# Patient Record
Sex: MALE | Race: WHITE | NOT HISPANIC OR LATINO | Employment: FULL TIME | URBAN - METROPOLITAN AREA
[De-identification: names, ages, dates, MRNs, and addresses within clinical notes are randomized per-mention and may not be internally consistent; named-entity substitution may affect disease eponyms.]

---

## 2017-08-01 ENCOUNTER — HOSPITAL ENCOUNTER (EMERGENCY)
Facility: OTHER | Age: 22
Discharge: HOME | End: 2017-08-01
Attending: EMERGENCY MEDICINE
Payer: COMMERCIAL

## 2017-08-01 VITALS
HEART RATE: 61 BPM | TEMPERATURE: 99 F | SYSTOLIC BLOOD PRESSURE: 122 MMHG | RESPIRATION RATE: 16 BRPM | OXYGEN SATURATION: 99 % | WEIGHT: 147.25 LBS | DIASTOLIC BLOOD PRESSURE: 61 MMHG

## 2017-08-01 DIAGNOSIS — S69.91XA HAND TRAUMA, RIGHT, INITIAL ENCOUNTER: Primary | ICD-10-CM

## 2017-08-01 DIAGNOSIS — S60.221A CONTUSION OF RIGHT HAND, INITIAL ENCOUNTER: ICD-10-CM

## 2017-08-01 PROCEDURE — 25000003 PHARM REV CODE 250: Performed by: EMERGENCY MEDICINE

## 2017-08-01 PROCEDURE — 99283 EMERGENCY DEPT VISIT LOW MDM: CPT

## 2017-08-01 RX ORDER — IBUPROFEN 600 MG/1
600 TABLET ORAL EVERY 8 HOURS PRN
Qty: 15 TABLET | Refills: 0 | Status: SHIPPED | OUTPATIENT
Start: 2017-08-01 | End: 2023-03-24

## 2017-08-01 RX ORDER — IBUPROFEN 600 MG/1
600 TABLET ORAL
Status: COMPLETED | OUTPATIENT
Start: 2017-08-01 | End: 2017-08-01

## 2017-08-01 RX ADMIN — IBUPROFEN 600 MG: 600 TABLET, FILM COATED ORAL at 07:08

## 2017-08-02 NOTE — ED TRIAGE NOTES
LOC: The patient is awake and alert; oriented x 3 and speaking appropriately.  APPEARANCE: Patient resting comfortably, patient is clean and well groomed  SKIN: warm and dry, normal skin turgor & moist mucus membranes, skin intact, no breakdown noted.  MUSCULOSKELETAL: Swelling noted to right hand.  RESPIRATORY: Airway is open and patent, breath sounds clear throughout all lung fields; respirations are spontaneous, normal effort and rate  CARDIAC: Patient has a normal rate, no peripheral edema noted, capillary refill < 3 seconds; No complaints of chest pain   ABDOMEN: Soft and non tender to palpation, no distention noted. Bowel sounds present x 4

## 2017-08-02 NOTE — ED TRIAGE NOTES
Patient states that he punched a chair out of anger earlier today.  Swelling noted to right hand.

## 2017-08-02 NOTE — DISCHARGE INSTRUCTIONS
Rest. If your hand continues to hurt in a few days, you may need to have your xrays repeated. Return for any new or acute problems or concerns.

## 2017-08-10 NOTE — ED PROVIDER NOTES
Encounter Date: 8/1/2017       History     Chief Complaint   Patient presents with    Hand Injury     states he punched a chair and now has pain and swelling to his rt hand     Mr Toussaint reports he punched a hard object earlier in the day and has pain to the dorsomedial aspect of his right hand.  He denies other trauma.  He reports it does hurt to move his wrist and the base of his right hand on the fifth digit side.  He has not taken anything for pain.  He denies any numbness or tingling.  He reports pain is moderate.      The history is provided by the patient.     Review of patient's allergies indicates:  No Known Allergies  Past Medical History:   Diagnosis Date    Tibia fracture     Left      Past Surgical History:   Procedure Laterality Date    NOSE SURGERY      TIBIA FRACTURE SURGERY       History reviewed. No pertinent family history.  Social History   Substance Use Topics    Smoking status: Former Smoker     Quit date: 7/23/2017    Smokeless tobacco: Never Used    Alcohol use No     Review of Systems   Musculoskeletal:        Positive right hand pain   All other systems reviewed and are negative.      Physical Exam     Initial Vitals [08/01/17 1917]   BP Pulse Resp Temp SpO2   122/61 61 16 99.1 °F (37.3 °C) 99 %      MAP       81.33         Physical Exam    Nursing note and vitals reviewed.  Constitutional: He appears well-developed and well-nourished. He is not diaphoretic. No distress.   HENT:   Head: Normocephalic and atraumatic.   Eyes: EOM are normal. Pupils are equal, round, and reactive to light.   Pulmonary/Chest: No respiratory distress.   Musculoskeletal: He exhibits tenderness.   Pain with passive and active range of motion of right hand.  Pain is more localized to the dorsomedial aspect of the hand, on the hyper thenar side of the hand.  Mild diffuse swelling noted in that region.  No deformity, perfusion abnormalities, skin breaks or other abnormality noted.   Neurological: He is alert.    Skin: Skin is warm. Capillary refill takes less than 2 seconds. No rash noted. No erythema.   Psychiatric: He has a normal mood and affect. His behavior is normal. Thought content normal.         ED Course   Procedures  Labs Reviewed - No data to display           Imaging Results          X-Ray Wrist Complete Right (Final result)  Result time 08/01/17 20:31:01    Final result by Neil Mason MD (08/01/17 20:31:01)                 Impression:      No acute fracture.      Electronically signed by: NEIL MASON MD  Date:     08/01/17  Time:    20:31              Narrative:    History: hand and wrist pain, injury.    RIGHT wrist 3 views:    No fractures or dislocations.  Unremarkable visualized bony structures.                             X-Ray Hand 3 View Right (Final result)  Result time 08/01/17 20:03:56   Procedure changed from X-Ray Hand 2 View Right     Final result by Rosana Jaramillo MD (08/01/17 20:03:56)                 Impression:      Please see above.      Electronically signed by: Rosana Jaramillo MD  Date:     08/01/17  Time:    20:03              Narrative:    Time of Procedure: 08/01/17 19:24:40  Accession # [id number]      Comparison: None.    Number of images: 3.  PA, lateral and oblique views.    Findings:  Pain marker is directed at the hypothenar eminence and the dorsum of the hand.  There may be soft tissue swelling at these sites.    However I detect no fracture, dislocation, radiopaque retained foreign body, lytic or blastic lesion.  Please note that certain foreign bodies including green wood and many forms of glass are not radiopaque.    Ulnar styloid is intact.      If the patient has point tenderness over the snuffbox I recommend prompt further assessment with dedicated views.                              Medical Decision Making:   ED Management:  Mr Toussaint does not report pain over snuffbox, has no pain with axial load manipulation of thumb. He is stable for d/c.  Discussed that not  all fractures can be seen initially and he should have repeat x-rays if continues to have pain in a few days.  He is stable for discharge.  There is no indication for further emergent intervention or evaluation at this time.                   ED Course     Clinical Impression:   The primary encounter diagnosis was Hand trauma, right, initial encounter. A diagnosis of Contusion of right hand, initial encounter was also pertinent to this visit.                           Leeanne Kenny MD  08/10/17 0744       Leeanne Kenny MD  08/10/17 0745

## 2023-03-24 ENCOUNTER — OFFICE VISIT (OUTPATIENT)
Dept: INTERNAL MEDICINE | Facility: CLINIC | Age: 28
End: 2023-03-24
Payer: COMMERCIAL

## 2023-03-24 VITALS
BODY MASS INDEX: 23.18 KG/M2 | HEART RATE: 59 BPM | DIASTOLIC BLOOD PRESSURE: 72 MMHG | SYSTOLIC BLOOD PRESSURE: 112 MMHG | WEIGHT: 156.5 LBS | TEMPERATURE: 97 F | HEIGHT: 69 IN

## 2023-03-24 DIAGNOSIS — R53.81 MALAISE AND FATIGUE: Primary | ICD-10-CM

## 2023-03-24 DIAGNOSIS — R53.83 MALAISE AND FATIGUE: Primary | ICD-10-CM

## 2023-03-24 DIAGNOSIS — R00.1 BRADYCARDIA: ICD-10-CM

## 2023-03-24 DIAGNOSIS — Z11.4 ENCOUNTER FOR SCREENING FOR HIV: ICD-10-CM

## 2023-03-24 DIAGNOSIS — Z13.29 SCREENING FOR THYROID DISORDER: ICD-10-CM

## 2023-03-24 DIAGNOSIS — Z13.220 SCREENING CHOLESTEROL LEVEL: ICD-10-CM

## 2023-03-24 DIAGNOSIS — Z13.1 DIABETES MELLITUS SCREENING: ICD-10-CM

## 2023-03-24 DIAGNOSIS — Z11.59 ENCOUNTER FOR HEPATITIS C SCREENING TEST FOR LOW RISK PATIENT: ICD-10-CM

## 2023-03-24 PROCEDURE — 99203 OFFICE O/P NEW LOW 30 MIN: CPT | Mod: S$GLB,,, | Performed by: INTERNAL MEDICINE

## 2023-03-24 PROCEDURE — 99203 PR OFFICE/OUTPT VISIT, NEW, LEVL III, 30-44 MIN: ICD-10-PCS | Mod: S$GLB,,, | Performed by: INTERNAL MEDICINE

## 2023-03-24 NOTE — PROGRESS NOTES
Chief C/o:    Establish Care and Nausea (Pt. c/o nausea and fatigue x 2 days. Pt. States fatigue has gotten better x 1 day.)        Health Care Maintenance    Health Maintenance         Date Due Completion Date    Hepatitis C Screening Never done ---    Lipid Panel Never done ---    HIV Screening Never done ---    TETANUS VACCINE 06/03/2019 6/3/2009    Influenza Vaccine (1) 06/30/2023 (Originally 9/1/2022) ---                   HISTORY OF PRESENT ILLNESS:    RAFA Toussaint is a 27 y.o. male who presents to the clinic today for Establish Care and Nausea (Pt. c/o nausea and fatigue x 2 days. Pt. States fatigue has gotten better x 1 day.)  .  Patient felt tired and fatigued 2 days ago with no fever no chills, no cough and no other complains, and today he feels a little bit better, his coming to be established as well.    He denies chest pain, no shortness of breath, no fever, no chills, no burning on urination, no other complain.                  ALLERGIES AND MEDICATIONS: updated and reviewed.  Review of patient's allergies indicates:  No Known Allergies  Medication List with Changes/Refills   Discontinued Medications    IBUPROFEN (ADVIL,MOTRIN) 600 MG TABLET    Take 1 tablet (600 mg total) by mouth every 8 (eight) hours as needed for Pain.       Problem List:  Patient Active Problem List   Diagnosis    BMI 23.0-23.9, adult    Bradycardia    Malaise and fatigue         CARE TEAM:    Patient Care Team:  Edwardo Roberto MD as PCP - General (Pediatrics)         REVIEW OF SYSTEMS:    Review of Systems   Constitutional:  Positive for fatigue. Negative for appetite change, chills, diaphoresis, fever and unexpected weight change.   HENT:  Negative for congestion, drooling, ear discharge, ear pain, facial swelling, hearing loss, nosebleeds, rhinorrhea, sinus pain, sneezing, sore throat, tinnitus, trouble swallowing and voice change.    Eyes:  Negative for pain, discharge, redness, itching and visual disturbance.  "  Respiratory:  Negative for cough, choking, chest tightness, shortness of breath, wheezing and stridor.    Cardiovascular:  Negative for chest pain, palpitations and leg swelling.   Gastrointestinal:  Negative for abdominal distention, abdominal pain, blood in stool, constipation, diarrhea, nausea and vomiting.   Endocrine: Negative for cold intolerance, heat intolerance, polydipsia, polyphagia and polyuria.   Genitourinary:  Negative for difficulty urinating, dysuria, flank pain, frequency, hematuria and urgency.   Musculoskeletal:  Negative for arthralgias, back pain, gait problem, joint swelling, myalgias, neck pain and neck stiffness.   Skin:  Negative for color change, pallor, rash and wound.   Allergic/Immunologic: Negative for environmental allergies, food allergies and immunocompromised state.   Neurological:  Negative for dizziness, tremors, seizures, syncope, speech difficulty, weakness, light-headedness, numbness and headaches.   Hematological:  Negative for adenopathy. Does not bruise/bleed easily.   Psychiatric/Behavioral:  Negative for agitation, behavioral problems, confusion, decreased concentration, dysphoric mood, hallucinations, sleep disturbance and suicidal ideas. The patient is not nervous/anxious.        PHYSICAL EXAM:    Vitals:    03/24/23 0914   BP: 112/72   Pulse: (!) 59   Temp: 97.4 °F (36.3 °C)     Weight: 71 kg (156 lb 8.4 oz)   Height: 5' 8.7" (174.5 cm)   Body mass index is 23.32 kg/m².  Vitals:    03/24/23 0914   BP: 112/72   Pulse: (!) 59   Temp: 97.4 °F (36.3 °C)   TempSrc: Temporal   Weight: 71 kg (156 lb 8.4 oz)   Height: 5' 8.7" (1.745 m)   PainSc: 0-No pain          Physical Exam  Vitals reviewed.   Constitutional:       General: He is not in acute distress.     Appearance: Normal appearance. He is normal weight. He is not ill-appearing, toxic-appearing or diaphoretic.      Comments: .on     HENT:      Head: Normocephalic and atraumatic.      Right Ear: Tympanic membrane, ear " canal and external ear normal. There is no impacted cerumen.      Left Ear: Tympanic membrane, ear canal and external ear normal. There is no impacted cerumen.      Nose: Nose normal. No congestion or rhinorrhea.      Mouth/Throat:      Mouth: Mucous membranes are moist.      Pharynx: Oropharynx is clear. No oropharyngeal exudate or posterior oropharyngeal erythema.   Eyes:      General: No scleral icterus.        Right eye: No discharge.         Left eye: No discharge.      Extraocular Movements: Extraocular movements intact.      Conjunctiva/sclera: Conjunctivae normal.      Pupils: Pupils are equal, round, and reactive to light.   Cardiovascular:      Rate and Rhythm: Regular rhythm. Bradycardia present.      Pulses: Normal pulses.      Heart sounds: Normal heart sounds. No murmur heard.    No friction rub. No gallop.   Pulmonary:      Effort: Pulmonary effort is normal. No respiratory distress.      Breath sounds: Normal breath sounds. No stridor. No wheezing, rhonchi or rales.   Chest:      Chest wall: No tenderness.   Abdominal:      General: Bowel sounds are normal. There is no distension.      Palpations: Abdomen is soft. There is no mass.      Tenderness: There is no abdominal tenderness. There is no guarding or rebound.      Hernia: No hernia is present.   Musculoskeletal:         General: No swelling, tenderness, deformity or signs of injury. Normal range of motion.      Cervical back: Normal range of motion and neck supple. No rigidity.      Right lower leg: No edema.      Left lower leg: No edema.   Lymphadenopathy:      Cervical: No cervical adenopathy.   Skin:     General: Skin is warm and dry.      Capillary Refill: Capillary refill takes less than 2 seconds.      Coloration: Skin is not jaundiced or pale.      Findings: No bruising, erythema, lesion or rash.   Neurological:      General: No focal deficit present.      Mental Status: He is alert and oriented to person, place, and time.      Cranial  Nerves: No cranial nerve deficit.      Sensory: No sensory deficit.      Motor: No weakness.      Coordination: Coordination normal.      Deep Tendon Reflexes: Reflexes normal.   Psychiatric:         Mood and Affect: Mood normal.         Behavior: Behavior normal.         Thought Content: Thought content normal.         Judgment: Judgment normal.          Labs:    Lab Results   Component Value Date    GLU 93 09/04/2014     09/04/2014    K 4.3 09/04/2014     09/04/2014    CO2 23 09/04/2014    BUN 15 09/04/2014    CREATININE 1.1 09/04/2014    CALCIUM 9.9 09/04/2014    PROT 7.4 09/04/2014    ALBUMIN 4.6 09/04/2014    BILITOT 0.5 09/04/2014    ALKPHOS 189 (H) 09/04/2014    AST 59 (H) 09/04/2014    ALT 88 (H) 09/04/2014    ANIONGAP 9 09/04/2014    ESTGFRAFRICA SEE COMMENT 09/04/2014    EGFRNONAA SEE COMMENT 09/04/2014        No results found for: CHOL, TRIG, HDL, LDLCALC, TOTALCHOLEST  No results found for: TSH  No results found for: HGBA1C, ESTIMATEDAVG   No components found for: MICROALBUMIN/CREATININE    ASSESSMENT & PLAN:    1. Malaise and fatigue  - CBC Auto Differential; Future  - Sedimentation Rate; Future  - Vitamin B12; Future  - CBC Auto Differential  - Sedimentation Rate  - Vitamin B12  Patient presenting with malaise and fatigue which is improving, possibility of viral syndrome, patient was advised to keep well hydrated and observe, to come for evaluation or call if needed, to go to the emergency room for any distress, this could be a prodrome of something going on.  2. Bradycardia  Asymptomatic  3. BMI 23.0-23.9, adult  Continue with healthy diet and exercise to keep BMI below 25 as it is.  4. Encounter for screening for HIV  - HIV 1 / 2 ANTIBODY; Future  - HIV 1 / 2 ANTIBODY    5. Encounter for hepatitis C screening test for low risk patient  - Hepatitis C Antibody; Future  - Hepatitis C Antibody    6. Diabetes mellitus screening  - Comprehensive Metabolic Panel; Future  - Hemoglobin A1C;  Future  - Comprehensive Metabolic Panel  - Hemoglobin A1C    7. Screening cholesterol level  - Lipid Panel; Future  - Lipid Panel    8. Screening for thyroid disorder  - TSH; Future  - TSH             Orders Placed This Encounter   Procedures    CBC Auto Differential    Comprehensive Metabolic Panel    Lipid Panel    TSH    Hemoglobin A1C    HIV 1 / 2 ANTIBODY    Hepatitis C Antibody    Sedimentation Rate    Vitamin B12      No follow-ups on file. or sooner as needed.    Patient was counseled and questions and concerns were addressed.    Please note:  Parts of this report were done using a dictation software, voice to text, and sometimes the text contains some uncorrected words or sentences that are missed during revision.

## 2023-03-27 LAB
ALBUMIN SERPL-MCNC: 4.4 G/DL (ref 3.6–5.1)
ALBUMIN/GLOB SERPL: 1.9 (CALC) (ref 1–2.5)
ALP SERPL-CCNC: 68 U/L
ALT SERPL-CCNC: 25 U/L
AST SERPL-CCNC: 36 U/L (ref 10–40)
BASOPHILS # BLD AUTO: 28 CELLS/UL (ref 0–200)
BASOPHILS NFR BLD AUTO: 0.4 %
BILIRUB SERPL-MCNC: 0.7 MG/DL (ref 0.2–1.2)
BUN SERPL-MCNC: 14 MG/DL (ref 7–25)
BUN/CREAT SERPL: NORMAL (CALC) (ref 6–22)
CALCIUM SERPL-MCNC: 9.2 MG/DL
CHLORIDE SERPL-SCNC: 105 MMOL/L (ref 98–110)
CHOLEST SERPL-MCNC: 149 MG/DL
CHOLEST/HDLC SERPL: 3.1 (CALC)
CO2 SERPL-SCNC: 26 MMOL/L (ref 20–32)
CREAT SERPL-MCNC: 0.93 MG/DL
EOSINOPHIL # BLD AUTO: 41 CELLS/UL (ref 15–500)
EOSINOPHIL NFR BLD AUTO: 0.6 %
ERYTHROCYTE [DISTWIDTH] IN BLOOD BY AUTOMATED COUNT: 12.7 % (ref 11–15)
ERYTHROCYTE [SEDIMENTATION RATE] IN BLOOD BY WESTERGREN METHOD: 2 MM/H
GLOBULIN SER CALC-MCNC: 2.3 G/DL (CALC) (ref 1.9–3.7)
GLUCOSE SERPL-MCNC: 84 MG/DL (ref 65–99)
HBA1C MFR BLD: 5 % OF TOTAL HGB
HCT VFR BLD AUTO: 41.2 % (ref 38.5–45)
HCV AB S/CO SERPL IA: 0.02
HCV AB SERPL QL IA: NORMAL
HDLC SERPL-MCNC: 48 MG/DL
HGB BLD-MCNC: 14.2 G/DL (ref 13.2–15.5)
HIV 1+2 AB+HIV1 P24 AG SERPL QL IA: NORMAL
LDLC SERPL CALC-MCNC: 87 MG/DL (CALC)
LYMPHOCYTES # BLD AUTO: 1663 CELLS/UL (ref 850–3900)
LYMPHOCYTES NFR BLD AUTO: 24.1 %
MCH RBC QN AUTO: 31.6 PG (ref 27–33)
MCHC RBC AUTO-ENTMCNC: 34.5 G/DL (ref 32–36)
MCV RBC AUTO: 91.6 FL (ref 80–100)
MONOCYTES # BLD AUTO: 538 CELLS/UL (ref 200–950)
MONOCYTES NFR BLD AUTO: 7.8 %
NEUTROPHILS # BLD AUTO: 4630 CELLS/UL (ref 1500–7800)
NEUTROPHILS NFR BLD AUTO: 67.1 %
NONHDLC SERPL-MCNC: 101 MG/DL (CALC)
PLATELET # BLD AUTO: 321 THOUSAND/UL (ref 140–400)
PMV BLD REES-ECKER: 10.3 FL (ref 7.5–12.5)
POTASSIUM SERPL-SCNC: 4.1 MMOL/L (ref 3.5–5.3)
PROT SERPL-MCNC: 6.7 G/DL (ref 6.1–8.1)
RBC # BLD AUTO: 4.5 MILLION/UL (ref 4.2–5.1)
SODIUM SERPL-SCNC: 140 MMOL/L (ref 135–146)
TRIGL SERPL-MCNC: 49 MG/DL
TSH SERPL-ACNC: 1.04 MIU/L (ref 0.4–4.5)
VIT B12 SERPL-MCNC: 450 PG/ML (ref 200–1100)
WBC # BLD AUTO: 6.9 THOUSAND/UL (ref 3.8–10.8)

## 2023-04-14 ENCOUNTER — OFFICE VISIT (OUTPATIENT)
Dept: INTERNAL MEDICINE | Facility: CLINIC | Age: 28
End: 2023-04-14
Payer: COMMERCIAL

## 2023-04-14 VITALS
SYSTOLIC BLOOD PRESSURE: 106 MMHG | WEIGHT: 158.63 LBS | HEART RATE: 75 BPM | TEMPERATURE: 98 F | DIASTOLIC BLOOD PRESSURE: 68 MMHG | BODY MASS INDEX: 23.49 KG/M2 | HEIGHT: 69 IN

## 2023-04-14 DIAGNOSIS — Z00.00 ROUTINE GENERAL MEDICAL EXAMINATION AT A HEALTH CARE FACILITY: Primary | ICD-10-CM

## 2023-04-14 DIAGNOSIS — R53.81 MALAISE AND FATIGUE: ICD-10-CM

## 2023-04-14 DIAGNOSIS — R53.83 MALAISE AND FATIGUE: ICD-10-CM

## 2023-04-14 PROCEDURE — 99395 PR PREVENTIVE VISIT,EST,18-39: ICD-10-PCS | Mod: S$GLB,,, | Performed by: INTERNAL MEDICINE

## 2023-04-14 PROCEDURE — 99395 PREV VISIT EST AGE 18-39: CPT | Mod: S$GLB,,, | Performed by: INTERNAL MEDICINE

## 2023-04-14 NOTE — PROGRESS NOTES
Chief C/o:    Fatigue (Lab results check.)        Health Care Maintenance    Health Maintenance         Date Due Completion Date    TETANUS VACCINE 06/03/2019 6/3/2009    Influenza Vaccine (1) 06/30/2023 (Originally 9/1/2022) ---                   HISTORY OF PRESENT ILLNESS:    RAFA Toussaint is a 27 y.o. male who presents to the clinic today for Fatigue (Lab results check.)  .   Patient has no active complaint today.Patient is having no chest pain, no shortness of breath, no fever no chills.  Patient is having no side effects related to current medications.                ALLERGIES AND MEDICATIONS: updated and reviewed.  Review of patient's allergies indicates:  No Known Allergies      Problem List:  Patient Active Problem List   Diagnosis    BMI 23.0-23.9, adult    Malaise and fatigue         CARE TEAM:    Patient Care Team:  Flora Pérez MD as PCP - General (Internal Medicine)         REVIEW OF SYSTEMS:    Review of Systems   Constitutional:  Negative for appetite change, chills, diaphoresis, fatigue (Fatigue is resolved), fever and unexpected weight change.   HENT:  Negative for congestion, drooling, ear discharge, ear pain, facial swelling, hearing loss, nosebleeds, rhinorrhea, sinus pain, sneezing, sore throat, tinnitus, trouble swallowing and voice change.    Eyes:  Negative for pain, discharge, redness, itching and visual disturbance.   Respiratory:  Negative for cough, choking, chest tightness, shortness of breath, wheezing and stridor.    Cardiovascular:  Negative for chest pain, palpitations and leg swelling.   Gastrointestinal:  Negative for abdominal distention, abdominal pain, blood in stool, constipation, diarrhea, nausea and vomiting.   Endocrine: Negative for cold intolerance, heat intolerance, polydipsia, polyphagia and polyuria.   Genitourinary:  Negative for difficulty urinating, dysuria, flank pain, frequency, hematuria and urgency.   Musculoskeletal:  Negative for arthralgias,  "back pain, gait problem, joint swelling, myalgias, neck pain and neck stiffness.   Skin:  Negative for color change, pallor, rash and wound.   Allergic/Immunologic: Negative for environmental allergies, food allergies and immunocompromised state.   Neurological:  Negative for dizziness, tremors, seizures, syncope, speech difficulty, weakness, light-headedness, numbness and headaches.   Hematological:  Negative for adenopathy. Does not bruise/bleed easily.   Psychiatric/Behavioral:  Negative for agitation, behavioral problems, confusion, decreased concentration, dysphoric mood, hallucinations, sleep disturbance and suicidal ideas. The patient is not nervous/anxious.        PHYSICAL EXAM:    Vitals:    04/14/23 0852   BP: 106/68   Pulse: 75   Temp: 97.9 °F (36.6 °C)     Weight: 72 kg (158 lb 9.9 oz)   Height: 5' 8.7" (174.5 cm)   Body mass index is 23.63 kg/m².  Vitals:    04/14/23 0852   BP: 106/68   Pulse: 75   Temp: 97.9 °F (36.6 °C)   TempSrc: Temporal   Weight: 72 kg (158 lb 9.9 oz)   Height: 5' 8.7" (1.745 m)   PainSc: 0-No pain          Physical Exam  Vitals reviewed.   Constitutional:       General: He is not in acute distress.     Appearance: Normal appearance. He is normal weight. He is not ill-appearing, toxic-appearing or diaphoretic.      Comments: .on     HENT:      Head: Normocephalic and atraumatic.      Right Ear: Tympanic membrane, ear canal and external ear normal. There is no impacted cerumen.      Left Ear: Tympanic membrane, ear canal and external ear normal. There is no impacted cerumen.      Nose: Nose normal. No congestion or rhinorrhea.      Mouth/Throat:      Mouth: Mucous membranes are moist.      Pharynx: Oropharynx is clear. No oropharyngeal exudate or posterior oropharyngeal erythema.   Eyes:      General: No scleral icterus.        Right eye: No discharge.         Left eye: No discharge.      Extraocular Movements: Extraocular movements intact.      Conjunctiva/sclera: Conjunctivae " normal.      Pupils: Pupils are equal, round, and reactive to light.   Cardiovascular:      Rate and Rhythm: Normal rate and regular rhythm.      Pulses: Normal pulses.      Heart sounds: Normal heart sounds. No murmur heard.    No friction rub. No gallop.   Pulmonary:      Effort: Pulmonary effort is normal. No respiratory distress.      Breath sounds: Normal breath sounds. No stridor. No wheezing, rhonchi or rales.   Chest:      Chest wall: No tenderness.   Abdominal:      General: Bowel sounds are normal. There is no distension.      Palpations: Abdomen is soft. There is no mass.      Tenderness: There is no abdominal tenderness. There is no guarding or rebound.      Hernia: No hernia is present.   Musculoskeletal:         General: No swelling, tenderness, deformity or signs of injury. Normal range of motion.      Cervical back: Normal range of motion and neck supple. No rigidity.      Right lower leg: No edema.      Left lower leg: No edema.   Lymphadenopathy:      Cervical: No cervical adenopathy.   Skin:     General: Skin is warm and dry.      Capillary Refill: Capillary refill takes less than 2 seconds.      Coloration: Skin is not jaundiced or pale.      Findings: No bruising, erythema, lesion or rash.   Neurological:      General: No focal deficit present.      Mental Status: He is alert and oriented to person, place, and time.      Cranial Nerves: No cranial nerve deficit.      Sensory: No sensory deficit.      Motor: No weakness.      Coordination: Coordination normal.      Deep Tendon Reflexes: Reflexes normal.   Psychiatric:         Mood and Affect: Mood normal.         Behavior: Behavior normal.         Thought Content: Thought content normal.         Judgment: Judgment normal.    __________________________________________________________  Questionnaires to be scanned to the media section of patient's EHR records:    1-PHQ-9.  2-DANY-7.  3-Safety at home.  4-Drug use  questionnaire  -------------------------------  Past medical history, Family history, Social history and Allergies were reviewed.      Labs:  Discussed with patient.    Lab Results   Component Value Date    GLU 84 03/24/2023     03/24/2023    K 4.1 03/24/2023     03/24/2023    CO2 26 03/24/2023    BUN 14 03/24/2023    CREATININE 0.93 03/24/2023    CALCIUM 9.2 03/24/2023    PROT 6.7 03/24/2023    ALBUMIN 4.4 03/24/2023    BILITOT 0.7 03/24/2023    ALKPHOS 189 (H) 09/04/2014    AST 36 03/24/2023    ALT 25 03/24/2023    ANIONGAP 9 09/04/2014    ESTGFRAFRICA SEE COMMENT 09/04/2014    EGFRNONAA SEE COMMENT 09/04/2014     Lab Results   Component Value Date    WBC 6.9 03/24/2023    RBC 4.50 03/24/2023    HGB 14.2 03/24/2023    HCT 41.2 03/24/2023    MCV 91.6 03/24/2023    RDW 12.7 03/24/2023     03/24/2023      Lab Results   Component Value Date    CHOL 149 03/24/2023    TRIG 49 03/24/2023    HDL 48 03/24/2023    LDLCALC 87 03/24/2023     Lab Results   Component Value Date    TSH 1.04 03/24/2023     Lab Results   Component Value Date    HGBA1C 5.0 03/24/2023      No components found for: MICROALBUMIN/CREATININE    ASSESSMENT & PLAN:    1. Routine general medical examination at a health care facility    2. Malaise and fatigue  Resolved  3. BMI 23.0-23.9, adult   Healthy diet and exercise to keep BMI below 25 as it is.    Patient is doing well currently, his lab test were within normal limits, he has no malaise or fatigue currently, will see him in about 1 year and when needed.      No orders of the defined types were placed in this encounter.     Follow up in about 1 year (around 4/14/2024), or if symptoms worsen or fail to improve. or sooner as needed.    Patient was counseled and questions and concerns were addressed.    Please note:  Parts of this report were done using a dictation software, voice to text, and sometimes the text contains some uncorrected words or sentences that are missed during  revision.

## 2024-09-06 ENCOUNTER — HOSPITAL ENCOUNTER (EMERGENCY)
Facility: HOSPITAL | Age: 29
Discharge: LEFT BEFORE DISCHARGE | End: 2024-09-06
Attending: EMERGENCY MEDICINE

## 2024-09-06 VITALS
DIASTOLIC BLOOD PRESSURE: 81 MMHG | BODY MASS INDEX: 23.7 KG/M2 | RESPIRATION RATE: 20 BRPM | SYSTOLIC BLOOD PRESSURE: 121 MMHG | OXYGEN SATURATION: 99 % | HEIGHT: 69 IN | HEART RATE: 80 BPM | TEMPERATURE: 98 F | WEIGHT: 160 LBS

## 2024-09-06 DIAGNOSIS — Z53.21 ELOPED FROM EMERGENCY DEPARTMENT: ICD-10-CM

## 2024-09-06 DIAGNOSIS — H57.9 EYE PROBLEM: Primary | ICD-10-CM

## 2024-09-06 PROCEDURE — 99282 EMERGENCY DEPT VISIT SF MDM: CPT | Mod: ER

## 2024-09-06 RX ORDER — TETRACAINE HYDROCHLORIDE 5 MG/ML
2 SOLUTION OPHTHALMIC
Status: DISCONTINUED | OUTPATIENT
Start: 2024-09-06 | End: 2024-09-06 | Stop reason: HOSPADM

## 2024-09-07 NOTE — ED PROVIDER NOTES
Encounter Date: 2024       History     Chief Complaint   Patient presents with    Eye Problem     Patient presents w/ a c/o foreign body to the right eye PTA. Pt reports he was playing on his phone, and out of nowhere felt something in his eye. Pt denies any direct injury or trauma to the eye. Flushed at home w/o any relief.     HPI  Patient eloped from the ER after 1st provider evaluation in triage.  Review of patient's allergies indicates:  No Known Allergies  Past Medical History:   Diagnosis Date    Tibia fracture     Left      Past Surgical History:   Procedure Laterality Date    NOSE SURGERY      TIBIA FRACTURE SURGERY       Family History   Problem Relation Name Age of Onset    No Known Problems Mother      Diabetes Father      Hypertension Father      Hyperlipidemia Father      No Known Problems Brother      No Known Problems Brother      No Known Problems Brother       Social History     Tobacco Use    Smoking status: Former     Current packs/day: 0.00     Types: Cigarettes     Quit date: 2017     Years since quittin.1    Smokeless tobacco: Never   Substance Use Topics    Alcohol use: Yes     Alcohol/week: 5.0 standard drinks of alcohol     Types: 2 Cans of beer, 3 Shots of liquor per week     Comment: Pt. drinks about 5 drinks weekly    Drug use: Yes     Frequency: 3.0 times per week     Types: Marijuana     Comment: smokes      Review of Systems  Patient eloped from the ER after 1st provider evaluation in triage.  Physical Exam     Initial Vitals [24 1848]   BP Pulse Resp Temp SpO2   121/81 80 20 97.9 °F (36.6 °C) 99 %      MAP       --         Physical Exam  Patient eloped from the ER after 1st provider evaluation in triage.  ED Course   Procedures  Labs Reviewed - No data to display       Imaging Results    None          Medications   fluorescein ophthalmic strip 1 each (has no administration in time range)   TETRAcaine HCl (PF) 0.5 % Drop 2 drop (has no administration in time range)      Medical Decision Making  Risk  Prescription drug management.    Patient eloped from the ER after 1st provider evaluation in triage.                                  Clinical Impression:  Final diagnoses:  [H57.9] Eye problem (Primary)  [Z53.21] Eloped from emergency department          ED Disposition Condition    Eloped Stable                Agus Matthews PA-C  09/06/24 2034

## 2024-09-07 NOTE — FIRST PROVIDER EVALUATION
"Medical screening examination initiated.  I have conducted a focused provider triage encounter, findings are as follows:    Brief history of present illness:  29-year-old male with no past medical history presents to ED for emergent evaluation of possible foreign body in his right eye that happened prior to arrival.  Patient states that he was playing on his phone and felt something go into his eye.    Vitals:    09/06/24 1848   BP: 121/81   BP Location: Right arm   Pulse: 80   Resp: 20   Temp: 97.9 °F (36.6 °C)   TempSrc: Oral   SpO2: 99%   Weight: 72.6 kg (160 lb)   Height: 5' 9" (1.753 m)       Pertinent physical exam:  No acute distress    Brief workup plan:  Further evaluation once patient is placed in a room    Preliminary workup initiated; this workup will be continued and followed by the physician or advanced practice provider that is assigned to the patient when roomed.  "